# Patient Record
Sex: FEMALE | Race: WHITE | NOT HISPANIC OR LATINO | Employment: PART TIME | URBAN - METROPOLITAN AREA
[De-identification: names, ages, dates, MRNs, and addresses within clinical notes are randomized per-mention and may not be internally consistent; named-entity substitution may affect disease eponyms.]

---

## 2023-03-24 ENCOUNTER — OFFICE VISIT (OUTPATIENT)
Dept: FAMILY MEDICINE | Facility: CLINIC | Age: 33
End: 2023-03-24
Payer: COMMERCIAL

## 2023-03-24 VITALS
TEMPERATURE: 96.9 F | HEART RATE: 75 BPM | DIASTOLIC BLOOD PRESSURE: 64 MMHG | SYSTOLIC BLOOD PRESSURE: 100 MMHG | OXYGEN SATURATION: 100 %

## 2023-03-24 DIAGNOSIS — Z91.013 SHELLFISH ALLERGY: Primary | ICD-10-CM

## 2023-03-24 PROBLEM — F41.0 PANIC ATTACK: Status: ACTIVE | Noted: 2022-12-13

## 2023-03-24 PROBLEM — J45.30 MILD PERSISTENT ASTHMA WITHOUT COMPLICATION: Status: ACTIVE | Noted: 2021-07-23

## 2023-03-24 PROCEDURE — 99203 OFFICE O/P NEW LOW 30 MIN: CPT

## 2023-03-24 RX ORDER — ALBUTEROL SULFATE 90 UG/1
2 AEROSOL, METERED RESPIRATORY (INHALATION)
COMMUNITY

## 2023-03-24 RX ORDER — METHYLPREDNISOLONE 4 MG
TABLET, DOSE PACK ORAL
Qty: 21 TABLET | Refills: 0 | Status: SHIPPED | OUTPATIENT
Start: 2023-03-24

## 2023-03-24 RX ORDER — SPIRONOLACTONE 50 MG/1
50 TABLET, FILM COATED ORAL
COMMUNITY
Start: 2022-11-16

## 2023-03-24 RX ORDER — BUDESONIDE AND FORMOTEROL FUMARATE DIHYDRATE 160; 4.5 UG/1; UG/1
2 AEROSOL RESPIRATORY (INHALATION)
COMMUNITY
Start: 2021-12-30

## 2023-03-24 RX ORDER — TRETINOIN 1 MG/G
CREAM TOPICAL
COMMUNITY
Start: 2022-11-16

## 2023-03-24 RX ORDER — CLINDAMYCIN PHOSPHATE 10 UG/ML
LOTION TOPICAL
COMMUNITY
Start: 2022-11-16

## 2023-03-24 NOTE — PATIENT INSTRUCTIONS
Continue using Benadryl 50 mg every 6 hours for the next 1 to 2 days, then as needed.  I did prescribed a steroid for you that you can  today. Start this if your symptoms are not improving on the Benadryl or new symptoms start.

## 2023-03-24 NOTE — PROGRESS NOTES
Assessment & Plan     Shellfish allergy  Anita was monitored in clinic for 45 minutes.  She was feeling much better at discharge with just using 50 mg of Benadryl.  We discussed getting an EpiPen.  She is going to see her primary at home and get this done.    At this point her symptoms were quite mild and managed well with Benadryl. Was given prescription for a Medrol dose pack to start tonight if her symptoms are not improving on the Benadryl.     - methylPREDNISolone (MEDROL DOSEPAK) 4 MG tablet therapy pack; Follow Package Directions      30 minutes spent on the date of the encounter doing chart review, patient visit and documentation        See Patient Instructions    Return in about 1 day (around 3/25/2023), or if symptoms worsen or fail to improve.    [facility]    Albert Howard is a 32 year old, presenting for the following health issues:  No chief complaint on file.  No flowsheet data found.  HPI     Anita was eating at one of the [employer] restaurants at the [facility] with her family.  She has a shellfish allergy and had not ordered anything with shellfish in it.  Started feeling like her tongue was thick and immediately took 50 mg of Benadryl and came to the clinic.  She denies any chest pain, shortness of breath, swelling of the lips or throat.  Does not carry an EpiPen with her for her shellfish allergy, but in the past and had to come in for an epinephrine injection for an allergic reaction.      Review of Systems   Constitutional, HEENT, cardiovascular, pulmonary, gi and gu systems are negative, except as otherwise noted.      Objective    There were no vitals taken for this visit.  There is no height or weight on file to calculate BMI.  Physical Exam   GENERAL: healthy, alert and no distress  EYES: Eyes grossly normal to inspection, PERRL and conjunctivae and sclerae normal  HENT: ear canals and  TM's normal, nose and mouth without ulcers or lesions  NECK: no adenopathy, no asymmetry, masses, or scars and thyroid normal to palpation  RESP: lungs clear to auscultation - no rales, rhonchi or wheezes  CV: regular rates and rhythm, normal S1 S2, no S3 or S4 and no murmur, click or rub